# Patient Record
Sex: FEMALE | Race: WHITE | NOT HISPANIC OR LATINO | URBAN - METROPOLITAN AREA
[De-identification: names, ages, dates, MRNs, and addresses within clinical notes are randomized per-mention and may not be internally consistent; named-entity substitution may affect disease eponyms.]

---

## 2017-03-06 ENCOUNTER — EMERGENCY (EMERGENCY)
Facility: HOSPITAL | Age: 72
LOS: 1 days | Discharge: HOME | End: 2017-03-06
Attending: EMERGENCY MEDICINE | Admitting: EMERGENCY MEDICINE
Payer: MEDICARE

## 2017-03-06 VITALS
HEIGHT: 66 IN | OXYGEN SATURATION: 98 % | WEIGHT: 149.91 LBS | TEMPERATURE: 98 F | DIASTOLIC BLOOD PRESSURE: 78 MMHG | RESPIRATION RATE: 18 BRPM | HEART RATE: 112 BPM | SYSTOLIC BLOOD PRESSURE: 145 MMHG

## 2017-03-06 DIAGNOSIS — S31.000A UNSPECIFIED OPEN WOUND OF LOWER BACK AND PELVIS WITHOUT PENETRATION INTO RETROPERITONEUM, INITIAL ENCOUNTER: ICD-10-CM
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
## 2017-03-06 DIAGNOSIS — Z90.710 ACQUIRED ABSENCE OF BOTH CERVIX AND UTERUS: Chronic | ICD-10-CM
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
## 2017-03-06 DIAGNOSIS — T81.9XXA UNSPECIFIED COMPLICATION OF PROCEDURE, INITIAL ENCOUNTER: Chronic | ICD-10-CM

## 2017-03-06 DIAGNOSIS — E78.5 HYPERLIPIDEMIA, UNSPECIFIED: ICD-10-CM

## 2017-03-06 DIAGNOSIS — E11.9 TYPE 2 DIABETES MELLITUS WITHOUT COMPLICATIONS: ICD-10-CM

## 2017-03-06 PROCEDURE — 99283 EMERGENCY DEPT VISIT LOW MDM: CPT | Mod: 25

## 2017-03-06 PROCEDURE — 99282 EMERGENCY DEPT VISIT SF MDM: CPT

## 2017-03-06 RX ORDER — MAGNESIUM HYDROXIDE 400 MG/1
0 TABLET, CHEWABLE ORAL
Qty: 0 | Refills: 0 | COMMUNITY

## 2017-03-06 RX ORDER — ROSUVASTATIN CALCIUM 5 MG/1
1 TABLET ORAL
Qty: 0 | Refills: 0 | COMMUNITY

## 2017-03-06 RX ORDER — FUROSEMIDE 40 MG
0 TABLET ORAL
Qty: 0 | Refills: 0 | COMMUNITY

## 2017-03-06 RX ORDER — ZINC SULFATE TAB 220 MG (50 MG ZINC EQUIVALENT) 220 (50 ZN) MG
0 TAB ORAL
Qty: 0 | Refills: 0 | COMMUNITY

## 2017-03-06 RX ORDER — CHOLECALCIFEROL (VITAMIN D3) 125 MCG
1 CAPSULE ORAL
Qty: 0 | Refills: 0 | COMMUNITY

## 2017-03-06 RX ORDER — ARIPIPRAZOLE 15 MG/1
1 TABLET ORAL
Qty: 0 | Refills: 0 | COMMUNITY

## 2017-03-06 RX ORDER — CLOPIDOGREL BISULFATE 75 MG/1
1 TABLET, FILM COATED ORAL
Qty: 0 | Refills: 0 | COMMUNITY

## 2017-03-06 RX ORDER — REPAGLINIDE 1 MG/1
2 TABLET ORAL
Qty: 0 | Refills: 0 | COMMUNITY

## 2017-03-06 RX ORDER — POLYETHYLENE GLYCOL 3350 17 G/17G
0 POWDER, FOR SOLUTION ORAL
Qty: 0 | Refills: 0 | COMMUNITY

## 2017-03-06 RX ORDER — METFORMIN HYDROCHLORIDE 850 MG/1
1 TABLET ORAL
Qty: 0 | Refills: 0 | COMMUNITY

## 2017-03-06 RX ORDER — CALCIUM POLYCARBOPHIL 625 MG/1
0 TABLET, FILM COATED ORAL
Qty: 0 | Refills: 0 | COMMUNITY

## 2017-03-06 RX ORDER — PANTOPRAZOLE SODIUM 20 MG/1
1 TABLET, DELAYED RELEASE ORAL
Qty: 0 | Refills: 0 | COMMUNITY

## 2017-03-06 RX ORDER — DONEPEZIL HYDROCHLORIDE 10 MG/1
1 TABLET, FILM COATED ORAL
Qty: 0 | Refills: 0 | COMMUNITY

## 2017-03-06 RX ORDER — LEVOTHYROXINE SODIUM 125 MCG
0 TABLET ORAL
Qty: 0 | Refills: 0 | COMMUNITY

## 2017-03-06 RX ORDER — LINAGLIPTIN 5 MG/1
1 TABLET, FILM COATED ORAL
Qty: 0 | Refills: 0 | COMMUNITY

## 2017-03-06 RX ORDER — ACETAMINOPHEN 500 MG
2 TABLET ORAL
Qty: 0 | Refills: 0 | COMMUNITY

## 2017-03-06 RX ORDER — LAMOTRIGINE 25 MG/1
1 TABLET, ORALLY DISINTEGRATING ORAL
Qty: 0 | Refills: 0 | COMMUNITY

## 2017-03-06 RX ORDER — DOCUSATE SODIUM 100 MG
0 CAPSULE ORAL
Qty: 0 | Refills: 0 | COMMUNITY

## 2017-03-06 RX ORDER — LANOLIN/MINERAL OIL
0 LOTION (ML) TOPICAL
Qty: 0 | Refills: 0 | COMMUNITY

## 2017-03-06 RX ORDER — FOLIC ACID 0.8 MG
1 TABLET ORAL
Qty: 0 | Refills: 0 | COMMUNITY

## 2017-03-06 RX ORDER — CALCIUM CARBONATE 500(1250)
0 TABLET ORAL
Qty: 0 | Refills: 0 | COMMUNITY

## 2017-03-06 NOTE — ED ADULT NURSE NOTE - CHIEF COMPLAINT QUOTE
deep chamorro sacral drainage tube dislocation deep chamorro sacral drainage tube dislocation - from [facility]

## 2017-03-06 NOTE — ED ADULT NURSE NOTE - PMH
Bipolar 1 disorder    Colon polyp    Depression    Diabetes  NIDDM  Edema of both legs    Hearing loss  mild  Hyperlipidemia Anemia    Bipolar 1 disorder    CAD (coronary artery disease)    Chronic kidney disease (CKD)    Colon polyp    Constipation    Degenerative disc disease, thoracic    Depression    Diabetes  NIDDM  Edema of both legs    Edentulous    GERD (gastroesophageal reflux disease)    Hearing loss  mild  Hemorrhoids    Hiatal hernia    Hyperlipidemia    Hypothyroid    Osteoporosis    Scoliosis    Spinal stenosis    Stroke    Venous insufficiency  chronic Anemia    Bell palsy    Bipolar 1 disorder    CAD (coronary artery disease)    Chronic kidney disease (CKD)    Colon polyp    Constipation    Decubitus ulcer of coccygeal region    Degenerative disc disease, thoracic    Dementia    Depression    Diabetes  NIDDM  Edema of both legs    Edentulous    GERD (gastroesophageal reflux disease)    Hearing loss  mild  Hemorrhoids    Hiatal hernia    Hyperlipidemia    Hypertension    Hypothyroid    Osteoarthritis    Osteoporosis    Scoliosis    Spinal stenosis    Stroke    Venous insufficiency  chronic

## 2017-03-06 NOTE — ED PROVIDER NOTE - PROGRESS NOTE DETAILS
spoke with dr block, no intervention necessary at this time. dressing is in place. pt to be d/c home, follow up with his office in the next 1-2 days

## 2017-03-06 NOTE — ED PROVIDER NOTE - PMH
Bipolar 1 disorder    Colon polyp    Depression    Diabetes  NIDDM  Edema of both legs    Hearing loss  mild  Hyperlipidemia

## 2017-03-06 NOTE — ED PROVIDER NOTE - OBJECTIVE STATEMENT
hx as per guardian at group home. 71yo female who presents after CANDIE drain was pulled out. pt had it placed for a sacral wound a week ago by dr block. pt was doing well until tonite when she pulled the drain out, no vomiting no fever. no further hx able to be obtained because pt is nonverbal

## 2017-03-06 NOTE — ED ADULT NURSE NOTE - OBJECTIVE STATEMENT
As per patient's caregiver , patient brought to the ED for CANDIE tube dislocation from the sacral area wound. No vomiting , no fever.

## 2017-04-09 ENCOUNTER — EMERGENCY (EMERGENCY)
Facility: HOSPITAL | Age: 72
LOS: 1 days | End: 2017-04-09
Attending: EMERGENCY MEDICINE | Admitting: EMERGENCY MEDICINE
Payer: MEDICARE

## 2017-04-09 VITALS
SYSTOLIC BLOOD PRESSURE: 94 MMHG | OXYGEN SATURATION: 90 % | TEMPERATURE: 96 F | HEART RATE: 87 BPM | DIASTOLIC BLOOD PRESSURE: 65 MMHG | RESPIRATION RATE: 12 BRPM

## 2017-04-09 DIAGNOSIS — Z90.710 ACQUIRED ABSENCE OF BOTH CERVIX AND UTERUS: Chronic | ICD-10-CM

## 2017-04-09 DIAGNOSIS — Z79.84 LONG TERM (CURRENT) USE OF ORAL HYPOGLYCEMIC DRUGS: ICD-10-CM

## 2017-04-09 DIAGNOSIS — F03.90 UNSPECIFIED DEMENTIA, UNSPECIFIED SEVERITY, WITHOUT BEHAVIORAL DISTURBANCE, PSYCHOTIC DISTURBANCE, MOOD DISTURBANCE, AND ANXIETY: ICD-10-CM

## 2017-04-09 DIAGNOSIS — I12.9 HYPERTENSIVE CHRONIC KIDNEY DISEASE WITH STAGE 1 THROUGH STAGE 4 CHRONIC KIDNEY DISEASE, OR UNSPECIFIED CHRONIC KIDNEY DISEASE: ICD-10-CM

## 2017-04-09 DIAGNOSIS — Z86.73 PERSONAL HISTORY OF TRANSIENT ISCHEMIC ATTACK (TIA), AND CEREBRAL INFARCTION WITHOUT RESIDUAL DEFICITS: ICD-10-CM

## 2017-04-09 DIAGNOSIS — I25.10 ATHEROSCLEROTIC HEART DISEASE OF NATIVE CORONARY ARTERY WITHOUT ANGINA PECTORIS: ICD-10-CM

## 2017-04-09 DIAGNOSIS — I46.9 CARDIAC ARREST, CAUSE UNSPECIFIED: ICD-10-CM

## 2017-04-09 DIAGNOSIS — F31.9 BIPOLAR DISORDER, UNSPECIFIED: ICD-10-CM

## 2017-04-09 DIAGNOSIS — T81.9XXA UNSPECIFIED COMPLICATION OF PROCEDURE, INITIAL ENCOUNTER: Chronic | ICD-10-CM

## 2017-04-09 DIAGNOSIS — M19.90 UNSPECIFIED OSTEOARTHRITIS, UNSPECIFIED SITE: ICD-10-CM

## 2017-04-09 DIAGNOSIS — E78.5 HYPERLIPIDEMIA, UNSPECIFIED: ICD-10-CM

## 2017-04-09 DIAGNOSIS — K21.9 GASTRO-ESOPHAGEAL REFLUX DISEASE WITHOUT ESOPHAGITIS: ICD-10-CM

## 2017-04-09 DIAGNOSIS — I87.2 VENOUS INSUFFICIENCY (CHRONIC) (PERIPHERAL): ICD-10-CM

## 2017-04-09 DIAGNOSIS — Z79.01 LONG TERM (CURRENT) USE OF ANTICOAGULANTS: ICD-10-CM

## 2017-04-09 DIAGNOSIS — E03.9 HYPOTHYROIDISM, UNSPECIFIED: ICD-10-CM

## 2017-04-09 DIAGNOSIS — N18.9 CHRONIC KIDNEY DISEASE, UNSPECIFIED: ICD-10-CM

## 2017-04-09 PROCEDURE — 92950 HEART/LUNG RESUSCITATION CPR: CPT

## 2017-04-09 PROCEDURE — 99285 EMERGENCY DEPT VISIT HI MDM: CPT | Mod: 25

## 2017-04-09 PROCEDURE — 96374 THER/PROPH/DIAG INJ IV PUSH: CPT | Mod: XU

## 2017-04-09 NOTE — ED ADULT NURSE NOTE - CHIEF COMPLAINT QUOTE
unwitnessed cardiac arrest, MES provided ACLS. vtach x1, 6 epi, 1 bicarb. 20RAC. tubed on arrival 7.5tube, 22 at the lip Unwitnessed cardiac arrest, MES provided ACLS. vtach x1, 6 epi, 1 bicarb. 20RAC. tubed on arrival 7.5tube, 22 at the lip Unwitnessed cardiac arrest, MES provided ACLS. vtach x1, 6 epi, 1 bicarb. 20RAC. brought in using thumper. tubed on arrival 7.5tube, 22 at the [facility]

## 2017-04-09 NOTE — ED ADULT NURSE NOTE - PMH
Anemia    Bell palsy    Bipolar 1 disorder    CAD (coronary artery disease)    Chronic kidney disease (CKD)    Colon polyp    Constipation    Decubitus ulcer of coccygeal region    Degenerative disc disease, thoracic    Dementia    Depression    Diabetes  NIDDM  Edema of both legs    Edentulous    GERD (gastroesophageal reflux disease)    Hearing loss  mild  Hemorrhoids    Hiatal hernia    Hyperlipidemia    Hypertension    Hypothyroid    Osteoarthritis    Osteoporosis    Scoliosis    Spinal stenosis    Stroke    Venous insufficiency  chronic

## 2017-04-09 NOTE — ED PROVIDER NOTE - OBJECTIVE STATEMENT
72 female notification to ER brought in by ambulance, states they were called to the scene at [facility] for hyperglycemia and unresponsive, when ambulance arrived CPR in progress, patient was intubated, received epinephrine, sodium bicarbonate and went into v-tach and was shocked x 1, regained pulse.

## 2017-04-09 NOTE — ED PROVIDER NOTE - PROGRESS NOTE DETAILS
upon arrival to ER patient lost pulse, CPR and ACLS protocol started for PEA, time of death pronounced at 8:10am, brother Lupillo notified, at the bedside, PMD Dr. Deejay lion, awaiting call back, Cardiologist Dr. Montoya made aware spoke with ME Dr. Madsen, will accept the case

## 2017-04-09 NOTE — ED ADULT TRIAGE NOTE - CHIEF COMPLAINT QUOTE
unwitnessed cardiac arrest, MES provided ACLS. vtach x1, 6 epi, 1 bicarb. 20RAC. tubed on arrival 7.5tube, 22 at the lip unwitnessed cardiac arrest, EMS provided ACLS. vtach x1, 6 epi, 1 bicarb. 20RAC. tubed on arrival 7.5tube, 22 at the lip

## 2017-04-09 NOTE — ED ADULT NURSE NOTE - OBJECTIVE STATEMENT
Unwitnessed cardiac arrest, EMS provided ACLS. vtach x1, 6 epi, 1 bicarb. 20RAC. tubed on arrival 7.5tube, 22 at the lip Unwitnessed cardiac arrest, EMS provided ACLS. vtach x1, 6 epi, 1 bicarb. 20RAC. tubed on arrival 7.5tube, 22 at the lip. brought in using thumper.
